# Patient Record
Sex: MALE | Race: WHITE | Employment: FULL TIME | URBAN - METROPOLITAN AREA
[De-identification: names, ages, dates, MRNs, and addresses within clinical notes are randomized per-mention and may not be internally consistent; named-entity substitution may affect disease eponyms.]

---

## 2022-01-28 ENCOUNTER — HOSPITAL ENCOUNTER (OUTPATIENT)
Dept: ULTRASOUND IMAGING | Age: 67
Discharge: HOME | End: 2022-01-28
Payer: COMMERCIAL

## 2022-01-28 DIAGNOSIS — I10 ESSENTIAL HYPERTENSION, MALIGNANT: ICD-10-CM

## 2022-01-28 DIAGNOSIS — R20.2 PARESTHESIA: ICD-10-CM

## 2022-01-28 PROCEDURE — 93880 EXTRACRANIAL BILAT STUDY: CPT

## 2022-04-13 ENCOUNTER — OFFICE VISIT (OUTPATIENT)
Dept: NEUROLOGY | Age: 67
End: 2022-04-13
Payer: COMMERCIAL

## 2022-04-13 VITALS
SYSTOLIC BLOOD PRESSURE: 126 MMHG | WEIGHT: 208 LBS | OXYGEN SATURATION: 97 % | BODY MASS INDEX: 25.86 KG/M2 | DIASTOLIC BLOOD PRESSURE: 71 MMHG | TEMPERATURE: 97.6 F | HEIGHT: 75 IN | HEART RATE: 69 BPM

## 2022-04-13 DIAGNOSIS — Q04.9 MEGA CISTERNA MAGNA (HCC): Primary | ICD-10-CM

## 2022-04-13 PROCEDURE — 99203 OFFICE O/P NEW LOW 30 MIN: CPT | Performed by: NURSE PRACTITIONER

## 2022-04-13 RX ORDER — LISINOPRIL 5 MG/1
TABLET ORAL DAILY
COMMUNITY
Start: 2022-02-01

## 2022-04-13 NOTE — PROGRESS NOTES
[address] [facility]. Ascencion Moulton M.D., F.A.C.P. Merle Pop, DNP, APRN, CNS  Kerrie Dillon. Rosa Blount, MSN, APRN-FNP-C  Checo Green MSN, APRN, FNP-C  Kelly HOLT, MILTON  [address] MSN, APRN, FNP-C  [address] [place] [address]  [place], [address] [address]  Phone: [phone]  Fax: [phone]       Karli Moreira is a 73 y.o. right handed male     Patient presents to neurology clinic today for evaluation of odd sensation to his head and abnormal neuro imaging. Patient presents alone and is deemed a good historian    Past Medical History:     Past Medical History:   Diagnosis Date    HTN (hypertension)     Hyperlipidemia        Past Surgical History:       Past Surgical History:   Procedure Laterality Date    TONSILLECTOMY         Allergies:       Patient has no known allergies. Medications:     Prior to Admission medications    Medication Sig Start Date End Date Taking? Authorizing Provider   lisinopril (PRINIVIL;ZESTRIL) 5 MG tablet daily 2/1/22  Yes Historical Provider, MD   HYDROcodone-acetaminophen (NORCO) 5-325 MG per tablet Take 1 tablet by mouth every 6 hours as needed. Patient not taking: Reported on 4/13/2022    Historical Provider, MD   amoxicillin-clavulanate (AUGMENTIN) 875-125 MG per tablet Take 1 tablet by mouth 2 times daily. Patient not taking: Reported on 4/13/2022    Historical Provider, MD       Social History:        reports that he has never smoked. He has never used smokeless tobacco. He reports current alcohol use of about 1.0 standard drink of alcohol per week. He reports that he does not use drugs. Patient is  and has 2 daughters--born in [facility]. Patient works in the  [employer].     Review of Systems:     No chest pain or palpitations  No SOB  No vertigo, lightheadedness or loss of consciousness  No falls, tripping or stumbling  No incontinence of bowels or bladder  No itching or bruising appreciated  No numbness, tingling or focal arm/leg weakness    ROS is otherwise negative    Family History:     No family history on file. History of Present Illness:     Patient began having \"odd sensation\" to the left side of his head in January 2022. Patient states he woke up 1 morning and felt an odd sensation described as pressure to the left side of his head. Patient states that after a week of having these daily intermittently he called his PCP. Patient describes this as a light pressure. Patient says he can go 2 to 3 days without having any sensations at all however it returns spontaneously lasting minutes at a time. Patient has no other associated symptoms and denies speech or swallowing difficulty, vision changes, light or sound sensitivity, nausea, vomiting or focal weakness. Patient denies seizures, LOC or falls. Patient was found to have fluid in his left ear during work-up from his PCP and he was prescribed Cleocin and meloxicam which did not help with the sensation. CT head was completed which suggested arachnoid cyst.  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 Patient was then referred to neurology for further evaluation and management. Today patient is more concerned about what was found rather than treatment and denies pain, distress or discomfort from this. Patient sleeps 8 hours a night. Patient eats 3 meals a day and drinks 4-5--16 ounce bottles of water daily. Patient drinks about 3 cups of coffee daily. Patient reports a mild stress level. Patient does not work out in a gym however is very active in his yard with wood cutting, raking and other yard work 7 days a week.       Objective:       /71 (Site: Right Upper Arm)   Pulse 69   Temp 97.6 °F (36.4 °C)   Ht 6' 3\" (1.905 m)   Wt 208 lb (94.3 kg)   SpO2 97%   BMI 26.00 kg/m²     General appearance: alert, appears stated age, cooperative and in no distress  Head: normocephalic, without obvious abnormality, atraumatic  Eyes: conjunctivae/corneas clear; no drainage  Neck: supple, symmetrical, trachea midline and thyroid not enlarged  Back: symmetric, no curvature.  ROM normal.   Lungs: clear to auscultation bilaterally  Heart: regular rate and rhythm  Abdomen: soft, non-tender; bowel sounds normal  Extremities: normal, atraumatic, no cyanosis or edema  Pulses: 2+ and symmetric  Skin:  color, texture, turgor normal--no rashes or lesions      Mental Status: alert and oriented x 4, follows commands well, pleasant    Appropriate attention/concentration  Intact fundus of knowledge  Memories intact    Speech: no dysarthria  Language: no aphasias---reading, writing, repetition, and object identification intact    Cranial Nerves:  I: smell    II: visual acuity     II: visual fields Full to finger counting bilaterally   II: pupils PERRL   III,VII: ptosis None   III,IV,VI: extraocular muscles  EOMI without nystagmus   V: mastication Normal   V: facial light touch sensation  Normal   V,VII: corneal reflex     VII: facial muscle function - upper  Normal   VII: facial muscle function - lower Normal   VIII: hearing Normal   IX: soft palate elevation  Normal   IX,X: gag reflex    XI: trapezius strength  5/5   XI: sternocleidomastoid strength 5/5   XI: neck extension strength  5/5   XII: tongue strength  Normal     Motor:  Strong bilateral handgrips  5/5 throughout  Normal bulk and tone  No drift   No abnormal movements    Sensory:  LT and PP normal  Vibration normal    Coordination:   FN, FFM and MELINDA normal  HS normal    Gait:  Normal  Walks well on toes, heels, and tandem    DTR:   Right Brachioradialis reflex 1+  Left Brachioradialis reflex 1+  Right Biceps reflex 1+  Left Biceps reflex 1+  Right Triceps reflex 1+  Left Triceps reflex 1+  Right Quadriceps reflex 1+  Left Quadriceps reflex 1+  Right Achilles reflex 1+  Left Achilles reflex 1+    No Babinskis  No Arceo's    No other pathological reflexes    Laboratory/Radiology:  ry/Radiology:     Labs from June 2021 in media section reviewed    Carotid ultrasound 1/28/2022:   Right and left carotid artery demonstrates 0 to 50% stenosis. Bilateral vertebral arteries are patent with flow in the normal direction. CT head 1/27/2022: There is prominent retrocerebellar CSF that causes mass effect upon the   cerebellum most likely representing an arachnoid cyst.  Consider further   evaluation with MRI of the brain. MRI brain with and without contrast 2/15/2022:  Prominent raven cisterna magna as an isolated finding without signal   abnormality in the adjacent brain parenchyma and with a normal appearance   to the ventricular system is typically felt to represent normal variation   with no prognostic significance. Mild changes of chronic microvascular ischemia within the guerrero. Otherwise unremarkable MRI examination of brain without and with   intravenous contrast.     CT head and MRI brain from start imaging personally reviewed    Assessment:     \"Odd sensation\"   Occurring to the left side of his head lasting seconds to minutes at a time, resolving spontaneously   This occurs intermittently sometimes daily, sometimes going days without occurring at all   CT head suggested arachnoid cyst MRI brain shows Raven cisterna magna   The sensation is not a pain and is not occurring constantly every day; patient does not want medication at this time    Plan:     Call if sensations become worse or more frequent  Call with any issues  Report to office in 6 months  If stable at that time can see ALBANIA Meyer  1:01 PM  4/13/2022    I spent 30 minutes with this patient obtaining the HPI and discussing the exam with greater than 50% of the time providing counseling and education on medications and other treatment plans. All questions were answered prior to leaving my office.

## 2022-11-01 ENCOUNTER — OFFICE VISIT (OUTPATIENT)
Dept: NEUROLOGY | Age: 67
End: 2022-11-01
Payer: MEDICARE

## 2022-11-01 VITALS
DIASTOLIC BLOOD PRESSURE: 74 MMHG | HEART RATE: 85 BPM | WEIGHT: 205 LBS | OXYGEN SATURATION: 98 % | HEIGHT: 74 IN | TEMPERATURE: 98.2 F | BODY MASS INDEX: 26.31 KG/M2 | SYSTOLIC BLOOD PRESSURE: 127 MMHG

## 2022-11-01 DIAGNOSIS — Q04.9 MEGA CISTERNA MAGNA (HCC): Primary | ICD-10-CM

## 2022-11-01 PROCEDURE — 99213 OFFICE O/P EST LOW 20 MIN: CPT | Performed by: NURSE PRACTITIONER

## 2022-11-01 PROCEDURE — 1123F ACP DISCUSS/DSCN MKR DOCD: CPT | Performed by: NURSE PRACTITIONER

## 2022-11-01 RX ORDER — CALCIUM CARBONATE/VITAMIN D3 500-10/5ML
1 LIQUID (ML) ORAL DAILY
COMMUNITY

## 2022-11-01 RX ORDER — ROSUVASTATIN CALCIUM 10 MG/1
10 TABLET, COATED ORAL DAILY
COMMUNITY

## 2022-11-01 RX ORDER — ASPIRIN 81 MG/1
81 TABLET ORAL DAILY
COMMUNITY

## 2022-11-01 NOTE — PROGRESS NOTES
[address] [facility]. Juani Stone M.D., F.A.C.P. Peyton Morrow, DNP, APRN, CNS  Yarelis Judge. Ozzie Soares, MSN, APRN-FNP-C  Cynthia Madrid MSN, APRN, FNP-C  Dama Nissen MSPAS, MILTON Isidro MSN, APRN, FNP-C  [address], [address]  [place], [address] [address]  Phone: [phone]  Fax: [phone]       Mariela Thapa is a 78 y.o. right handed male     Patient presents to neurology clinic today for evaluation of odd sensation to his head and abnormal neuro imaging. Patient presents alone and is deemed a good historian    Patient began having \"odd sensation\" to the left side of his head in January 2022. Patient states he woke up 1 morning and felt an odd sensation described as pressure to the left side of his head. Patient states that after a week of having these daily intermittently he called his PCP. Patient describes this as a light pressure. Patient says he can go 2 to 3 days without having any sensations at all however it returns spontaneously lasting minutes at a time. Patient has no other associated symptoms and denies speech or swallowing difficulty, vision changes, light or sound sensitivity, nausea, vomiting or focal weakness. Patient denies seizures, LOC or falls. Patient was found to have fluid in his left ear during work-up from his PCP and he was prescribed Cleocin and meloxicam which did not help with the sensation. CT head was completed which suggested arachnoid cyst.  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 Patient was then referred to neurology for further evaluation and management. On initial visit, patient was more concerned about what was found rather than treatment and denies pain, distress or discomfort from this. Since his last visit, patient says it went away in June. However now he notices it more in the morning upon wakening. This sensation resolves spontaneously throughout the day.   Patient has not identified any triggers and these sensations appear to be random occurrences. These are now occurring about twice a week. Patient has found relief with taking Tylenol when these occur. Patient still describes this as a tingling sensation or pressure and not a pain or headache. Patient denies any new medical issues since his last visit. Patient sleeps 8 hours a night. Patient eats 3 meals a day and drinks 4-5--16 ounce bottles of water daily. Patient drinks about 3 cups of coffee daily. Patient reports a mild stress level. Patient does not work out in a gym however is very active in his yard with wood cutting, raking and other yard work 7 days a week. He reports that he has never smoked. He has never used smokeless tobacco. He reports current alcohol use of about 1.0 standard drink per week. He reports that he does not use drugs. Patient is  and has 2 daughters--born in [facility]. Patient works in the [employer] at [email or web address]. Allergies:       Patient has no known allergies. Medications:     Prior to Admission medications    Medication Sig Start Date End Date Taking?  Authorizing Provider   rosuvastatin (CRESTOR) 10 MG tablet Take 10 mg by mouth daily   Yes Historical Provider, MD   Zinc 30 MG CAPS Take 1 capsule by mouth daily   Yes Historical Provider, MD   aspirin (ASPIRIN 81) 81 MG EC tablet Take 81 mg by mouth daily   Yes Historical Provider, MD   lisinopril (PRINIVIL;ZESTRIL) 5 MG tablet daily 2/1/22  Yes Historical Provider, MD     Objective:       /74 (Site: Right Upper Arm, Position: Sitting)   Pulse 85   Temp 98.2 °F (36.8 °C) (Temporal)   Ht 6' 2\" (1.88 m)   Wt 205 lb (93 kg)   SpO2 98%   BMI 26.32 kg/m²     General appearance: alert, appears stated age, cooperative and in no distress  Head: normocephalic, without obvious abnormality, atraumatic   No tenderness to frontal, temporal, sinuses or occipital  Eyes: conjunctivae/corneas clear; no drainage  Neck: supple, symmetrical, trachea midline and thyroid not enlarged  Back: symmetric, no curvature.  ROM normal.  No trapezius muscle tenderness  Lungs: clear to auscultation bilaterally  Heart: regular rate and rhythm  Abdomen: soft, non-tender; bowel sounds normal  Extremities: normal, atraumatic, no cyanosis or edema  Pulses: 2+ and symmetric  Skin:  color, texture, turgor normal--no rashes or lesions      Mental Status: alert and oriented x 4, follows commands well, pleasant    Appropriate attention/concentration  Intact fundus of knowledge  Memories intact    Speech: no dysarthria  Language: no aphasias---reading, writing, repetition, and object identification intact    Cranial Nerves:  I: smell    II: visual acuity     II: visual fields Full to finger counting bilaterally   II: pupils PERRL   III,VII: ptosis None   III,IV,VI: extraocular muscles  EOMI without nystagmus   V: mastication Normal   V: facial light touch sensation  Normal   V,VII: corneal reflex     VII: facial muscle function - upper  Normal   VII: facial muscle function - lower Normal   VIII: hearing Normal   IX: soft palate elevation  Normal   IX,X: gag reflex    XI: trapezius strength  5/5   XI: sternocleidomastoid strength 5/5   XI: neck extension strength  5/5   XII: tongue strength  Normal     Motor:  Strong bilateral handgrips  5/5 throughout  Normal bulk and tone  No drift   No abnormal movements    Sensory:  LT and PP normal  Vibration normal    Coordination:   FN, FFM and MELINDA normal  HS normal    Gait:  Normal  Walks well on toes, heels, and tandem    DTR:   Right Brachioradialis reflex 1+  Left Brachioradialis reflex 1+  Right Biceps reflex 1+  Left Biceps reflex 1+  Right Triceps reflex 1+  Left Triceps reflex 1+  Right Quadriceps reflex 1+  Left Quadriceps reflex 1+  Right Achilles reflex 1+  Left Achilles reflex 1+    No Babinskis  No Arceo's    No other pathological reflexes    Laboratory/Radiology:  ry/Radiology:     Labs from June 2021 in media section reviewed    Carotid ultrasound 1/28/2022:   Right and left carotid artery demonstrates 0 to 50% stenosis. Bilateral vertebral arteries are patent with flow in the normal direction. CT head 1/27/2022: There is prominent retrocerebellar CSF that causes mass effect upon the   cerebellum most likely representing an arachnoid cyst.  Consider further   evaluation with MRI of the brain. MRI brain with and without contrast 2/15/2022:  Prominent raven cisterna magna as an isolated finding without signal   abnormality in the adjacent brain parenchyma and with a normal appearance   to the ventricular system is typically felt to represent normal variation   with no prognostic significance. Mild changes of chronic microvascular ischemia within the guerrero. Otherwise unremarkable MRI examination of brain without and with   intravenous contrast.     CT head and MRI brain from start imaging personally reviewed    Assessment:     \"Odd sensation\"   Occurring to the left side of his head lasting seconds to minutes at a time, resolving spontaneously   This occurs intermittently sometimes daily, sometimes going days without occurring at all   CT head suggested arachnoid cyst MRI brain shows Raven cisterna magna   The sensation is not a pain and is not occurring constantly every day; patient does not want medication at this time   Patient has found benefit with using Tylenol. Plan:     Patient will continue to use Tylenol when needed  Call if sensations become worse or more frequent  Call with any issues  Report to office ALBANIA Whittaker - NP-C  10:13 AM  11/1/2022    I spent 22 minutes with this patient obtaining the HPI and discussing the exam with greater than 50% of the time providing counseling and education on medications and other treatment plans. All questions were answered prior to leaving my office.